# Patient Record
Sex: FEMALE | Race: OTHER | Employment: FULL TIME | URBAN - METROPOLITAN AREA
[De-identification: names, ages, dates, MRNs, and addresses within clinical notes are randomized per-mention and may not be internally consistent; named-entity substitution may affect disease eponyms.]

---

## 2018-04-10 ENCOUNTER — HOSPITAL ENCOUNTER (OUTPATIENT)
Dept: MAMMOGRAPHY | Age: 43
Discharge: HOME | End: 2018-04-10
Attending: INTERNAL MEDICINE | Admitting: INTERNAL MEDICINE

## 2018-04-10 DIAGNOSIS — Z12.31 VISIT FOR SCREENING MAMMOGRAM: ICD-10-CM

## 2020-07-03 ENCOUNTER — HOSPITAL ENCOUNTER (EMERGENCY)
Age: 45
Discharge: HOME | End: 2020-07-03
Attending: EMERGENCY MEDICINE

## 2020-07-03 ENCOUNTER — APPOINTMENT (OUTPATIENT)
Dept: GENERAL RADIOLOGY | Age: 45
End: 2020-07-03

## 2020-07-03 VITALS
HEART RATE: 78 BPM | SYSTOLIC BLOOD PRESSURE: 148 MMHG | DIASTOLIC BLOOD PRESSURE: 80 MMHG | RESPIRATION RATE: 18 BRPM | OXYGEN SATURATION: 99 % | TEMPERATURE: 98.6 F

## 2020-07-03 PROCEDURE — 99283 EMERGENCY DEPT VISIT LOW MDM: CPT

## 2020-07-03 PROCEDURE — U0003 INFECTIOUS AGENT DETECTION BY NUCLEIC ACID (DNA OR RNA); SEVERE ACUTE RESPIRATORY SYNDROME CORONAVIRUS 2 (SARS-COV-2) (CORONAVIRUS DISEASE [COVID-19]), AMPLIFIED PROBE TECHNIQUE, MAKING USE OF HIGH THROUGHPUT TECHNOLOGIES AS DESCRIBED BY CMS-2020-01-R: HCPCS

## 2020-07-03 PROCEDURE — U0002 COVID-19 LAB TEST NON-CDC: HCPCS

## 2020-07-03 PROCEDURE — 71045 X-RAY EXAM CHEST 1 VIEW: CPT

## 2020-07-03 RX ORDER — AZITHROMYCIN 250 MG/1
TABLET, FILM COATED ORAL
Qty: 1 PACKET | Refills: 0 | Status: SHIPPED | OUTPATIENT
Start: 2020-07-03 | End: 2021-12-31

## 2020-07-03 RX ORDER — BENZONATATE 100 MG/1
100 CAPSULE ORAL 3 TIMES DAILY PRN
Qty: 30 CAPSULE | Refills: 0 | Status: SHIPPED | OUTPATIENT
Start: 2020-07-03 | End: 2020-07-10

## 2020-07-03 NOTE — ED TRIAGE NOTES
Patient brought to treatment area wearing a mask. Rn wearing procedure mask, safety goggles and face shield. Patient c/o productive cough and tingling in throat for 4 weeks. States symptoms come and go. Denies fever, shortness of breath, chest pain, nausea or vomiting. States she was exposed to the corona virus through her 7 month old nephew who tested positive. States she quarantine for 2 weeks and then went back to work and then developed the productive cough. History obtained through  line # [id number].

## 2020-07-03 NOTE — ED PROVIDER NOTES
Emergency Physician Note    Chief Complaint  Cough (c/o productive cough for 4 weeks and tingling in throat.  )       History of Present Illness  Chay Cifuentes is a 39 y.o. female who presents to the ED for cough. Patient gives a history through [employer] [place] . She reports that she has had a productive cough of green sputum for the last 4 weeks off and on. No history of asthma or COPD. She was exposed to a nephew that was positive for COVID-19 but that exposure was over 2 weeks ago. She denies any fevers, sweats or chills. No chest pain or shortness of breath. No nausea, vomiting or diarrhea. No rhinorrhea or sore throat. 10 systems reviewed, pertinent positives per HPI otherwise noted to be negative    I have reviewed the following from the nursing documentation:      Prior to Admission medications    Not on File       Allergies as of 07/03/2020    (No Known Allergies)       History reviewed. No pertinent past medical history. Surgical History: History reviewed. No pertinent surgical history. Family History:  History reviewed. No pertinent family history.     Social History     Socioeconomic History    Marital status: Single     Spouse name: Not on file    Number of children: Not on file    Years of education: Not on file    Highest education level: Not on file   Occupational History    Not on file   Social Needs    Financial resource strain: Not on file    Food insecurity     Worry: Not on file     Inability: Not on file    Transportation needs     Medical: Not on file     Non-medical: Not on file   Tobacco Use    Smoking status: Never Smoker   Substance and Sexual Activity    Alcohol use: No    Drug use: Not on file    Sexual activity: Yes     Partners: Male   Lifestyle    Physical activity     Days per week: Not on file     Minutes per session: Not on file    Stress: Not on file   Relationships    Social connections     Talks on phone: Not on file     Gets together: Not on file     Attends Orthodox service: Not on file     Active member of club or organization: Not on file     Attends meetings of clubs or organizations: Not on file     Relationship status: Not on file    Intimate partner violence     Fear of current or ex partner: Not on file     Emotionally abused: Not on file     Physically abused: Not on file     Forced sexual activity: Not on file   Other Topics Concern    Not on file   Social History Narrative    Not on file       Nursing notes reviewed. ED Triage Vitals [07/03/20 1804]   Enc Vitals Group      BP (!) 148/80      Pulse 78      Resp 18      Temp 98.6 °F (37 °C)      Temp Source Oral      SpO2 99 %      Weight       Height       Head Circumference       Peak Flow       Pain Score       Pain Loc       Pain Edu? Excl. in [address]? GENERAL:  Awake, alert. Well developed, well nourished with no apparent distress. HENT:  Normocephalic, Atraumatic, moist mucous membranes. Posterior pharynx without erythema, edema or exudate. EYES:  Pupils equal round and reactive to light, Conjunctiva normal, extraocular movements normal.  NECK:  No meningeal signs, Supple. CHEST:  Regular rate and rhythm, chest wall non-tender. LUNGS: Few scattered rhonchi but otherwise clear bilaterally. ABDOMEN:  Soft, non-tender, no rebound, rigidity or guarding, non-distended, normal bowel sounds. No costovertebral angle tenderness to palpation. BACK:  No tenderness. EXTREMITIES:  Normal range of motion, no edema, no bony tenderness, no deformity, distal pulses present. SKIN: Warm, dry and intact. NEUROLOGIC: Normal mental status. Moving all extremities to command. RADIOLOGY  X-RAYS:  I have reviewed radiologic plain film image(s). ALL OTHER NON-PLAIN FILM IMAGES SUCH AS CT, ULTRASOUND AND MRI HAVE BEEN READ BY THE RADIOLOGIST. XR CHEST PORTABLE   Final Result   Unremarkable portable chest radiograph.               LABS  Labs Reviewed   COVID-19   COVID-19 COVID-19       MEDICAL DECISION MAKING     I advised the patient to return to the emergency department immediately for any new or worsening symptoms. The patient voiced agreement and understanding of the treatment plan. The patient is low risk for mortality based on demographic, history and clinical factors. Given best available information and clinical assessment, I estimate the risk of hospitalization be greater than the risk of treatment at home. I have explained to the patient that the risk could rapidly change, given precautions for return and instructions on how to minimize being contagious. No results found for this visit on 07/03/20. I estimate there is LOW risk for EPIGLOTTITIS, PNEUMONIA, MENINGITIS, OR URINARY TRACT INFECTION, thus I consider the discharge disposition reasonable. Also, there is no evidence or peritonitis, sepsis, or toxicity. [facility] and I have discussed the diagnosis and risks, and we agree with discharging home to follow-up with their primary doctor. We also discussed returning to the Emergency Department immediately if new or worsening symptoms occur. We have discussed the symptoms which are most concerning (e.g., changing or worsening pain, trouble swallowing or breating, neck stiffness, fever) that necessitate immediate return. Final Impression    1. Bronchitis    2. Upper respiratory tract infection, unspecified type        Discharge Vital Signs:  Blood pressure (!) 148/80, pulse 78, temperature 98.6 °F (37 °C), temperature source Oral, resp. rate 18, last menstrual period 06/06/2020, SpO2 99 %. Patient was given scripts for the following medications. I counseled patient how to take these medications.   New Prescriptions    AZITHROMYCIN (ZITHROMAX) 250 MG TABLET    Per package instructions    BENZONATATE (TESSALON PERLES) 100 MG CAPSULE    Take 1 capsule by mouth 3 times daily as needed for Cough       Disposition  Pt is in good condition upon Discharge

## 2020-07-04 ENCOUNTER — CARE COORDINATION (OUTPATIENT)
Dept: CARE COORDINATION | Age: 45
End: 2020-07-04

## 2020-07-04 LAB — SARS-COV-2, PCR: NOT DETECTED

## 2020-07-04 NOTE — CARE COORDINATION
Care Transition phone outreach s/p acute care visit 7. 3.20  With support of [employer], left detailed vm requesting return callback in review of pt status. Provided direct contact number and [facility] as well.

## 2020-07-05 NOTE — RESULT ENCOUNTER NOTE
Patient's negative result has been appropriately evaluated by the provider pool. Patient was unable to be reached over the phone. A voicemail was left with the patient. Will await a return phone call. Will try to reach patient again tomorrow per protocol.

## 2021-12-31 ENCOUNTER — HOSPITAL ENCOUNTER (EMERGENCY)
Age: 46
Discharge: HOME | End: 2021-12-31
Attending: EMERGENCY MEDICINE

## 2021-12-31 VITALS
SYSTOLIC BLOOD PRESSURE: 122 MMHG | RESPIRATION RATE: 14 BRPM | DIASTOLIC BLOOD PRESSURE: 75 MMHG | TEMPERATURE: 97.9 F | HEART RATE: 94 BPM | BODY MASS INDEX: 41.96 KG/M2 | WEIGHT: 228.02 LBS | OXYGEN SATURATION: 95 % | HEIGHT: 62 IN

## 2021-12-31 DIAGNOSIS — J02.9 ACUTE PHARYNGITIS, UNSPECIFIED ETIOLOGY: Primary | ICD-10-CM

## 2021-12-31 LAB
RAPID INFLUENZA  B AGN: NEGATIVE
RAPID INFLUENZA A AGN: NEGATIVE
S PYO AG THROAT QL: NEGATIVE

## 2021-12-31 PROCEDURE — 87081 CULTURE SCREEN ONLY: CPT

## 2021-12-31 PROCEDURE — 6360000002 HC RX W HCPCS: Performed by: EMERGENCY MEDICINE

## 2021-12-31 PROCEDURE — 87880 STREP A ASSAY W/OPTIC: CPT

## 2021-12-31 PROCEDURE — 87804 INFLUENZA ASSAY W/OPTIC: CPT

## 2021-12-31 PROCEDURE — 99284 EMERGENCY DEPT VISIT MOD MDM: CPT

## 2021-12-31 RX ORDER — DEXAMETHASONE 4 MG/1
6 TABLET ORAL ONCE
Status: COMPLETED | OUTPATIENT
Start: 2021-12-31 | End: 2021-12-31

## 2021-12-31 RX ADMIN — DEXAMETHASONE 6 MG: 4 TABLET ORAL at 11:17

## 2021-12-31 ASSESSMENT — PAIN DESCRIPTION - DESCRIPTORS: DESCRIPTORS: ACHING

## 2021-12-31 ASSESSMENT — PAIN - FUNCTIONAL ASSESSMENT: PAIN_FUNCTIONAL_ASSESSMENT: 0-10

## 2021-12-31 ASSESSMENT — PAIN DESCRIPTION - PAIN TYPE: TYPE: ACUTE PAIN

## 2021-12-31 ASSESSMENT — PAIN DESCRIPTION - FREQUENCY: FREQUENCY: CONTINUOUS

## 2021-12-31 ASSESSMENT — PAIN SCALES - GENERAL
PAINLEVEL_OUTOF10: 8

## 2021-12-31 ASSESSMENT — PAIN DESCRIPTION - LOCATION: LOCATION: THROAT

## 2021-12-31 NOTE — ED NOTES
Dc'd to home  Awake alert  Denied need for interpretor for discharge  Aware we will call for further culture results  To return for worsening or changes  To monitor temperature  Denied need for work note     Yoandy Salinas RN  12/31/21 1122

## 2021-12-31 NOTE — ED PROVIDER NOTES
CHIEF COMPLAINT  Pharyngitis and Nasal Congestion      HISTORY OF PRESENT ILLNESS  Cesar Tobar is a 42 y.o. female who presents emerged department for evaluation of sore throat, nasal congestion. She states that she has had the symptoms since Monday. She denies any known sick contacts to Covid or flu. She states that she has had pain in her throat. She has been able to tolerate foods and liquids. She denies any fever. Denies nausea or vomiting, abdominal pain. Denies headache or neck stiffness. History reviewed. No pertinent past medical history. History reviewed. No pertinent surgical history. History reviewed. No pertinent family history. Social History     Socioeconomic History    Marital status: Single     Spouse name: Not on file    Number of children: Not on file    Years of education: Not on file    Highest education level: Not on file   Occupational History    Not on file   Tobacco Use    Smoking status: Never Smoker    Smokeless tobacco: Never Used   Substance and Sexual Activity    Alcohol use: No    Drug use: Not on file    Sexual activity: Yes     Partners: Male   Other Topics Concern    Not on file   Social History Narrative    Not on file     Social Determinants of Health     Financial Resource Strain:     Difficulty of Paying Living Expenses: Not on file   Food Insecurity:     Worried About Running Out of Food in the Last Year: Not on file    Lluvia of Food in the Last Year: Not on file   Transportation Needs:     Lack of Transportation (Medical): Not on file    Lack of Transportation (Non-Medical):  Not on file   Physical Activity:     Days of Exercise per Week: Not on file    Minutes of Exercise per Session: Not on file   Stress:     Feeling of Stress : Not on file   Social Connections:     Frequency of Communication with Friends and Family: Not on file    Frequency of Social Gatherings with Friends and Family: Not on file    Attends [employer]ze Services: Not on file   [facility] or Organizations: Not on file    Attends Club or Organization Meetings: Not on file    Marital Status: Not on file   Intimate Partner Violence:     Fear of Current or Ex-Partner: Not on file    Emotionally Abused: Not on file    Physically Abused: Not on file    Sexually Abused: Not on file   Housing Stability:     Unable to Pay for Housing in the Last Year: Not on file    Number of [place] in the Last Year: Not on file    Unstable Housing in the Last Year: Not on file     Current Facility-Administered Medications   Medication Dose Route Frequency Provider Last Rate Last Admin    dexamethasone (DECADRON) tablet 6 mg  6 mg Oral Once Eleanor Truong MD         No current outpatient medications on file. No Known Allergies    REVIEW OF SYSTEMS  Review of Systems   Constitutional: Negative for chills and fever. HENT: Positive for congestion and sore throat. Negative for trouble swallowing and voice change. Respiratory: Negative for cough, shortness of breath and stridor. Gastrointestinal: Negative for nausea and vomiting. Musculoskeletal: Negative for myalgias, neck pain and neck stiffness. Skin: Negative for rash. Neurological: Negative for weakness, light-headedness and headaches. PHYSICAL EXAM  /75   Pulse 94   Temp 97.9 °F (36.6 °C) (Oral)   Resp 14   Ht 5' 2\" (1.575 m)   Wt 228 lb 0.3 oz (103.4 kg)   LMP 12/07/2021   SpO2 95%   BMI 41.71 kg/m²   GENERAL APPEARANCE: Awake and alert. Cooperative. HEAD: Normocephalic. Atraumatic. EYES: PERRL. EOM's grossly intact. ENT: Mucous membranes are moist.  No significant posterior oropharyngeal swelling or erythema. NECK: Supple, trachea midline. No significant lymphadenopathy  HEART: RRR. No harsh murmurs. Intact radial pulses 2+ bilaterally. LUNGS: Respirations unlabored without accessory muscle use. CTAB. Good air exchange. No wheezes, rales, or rhonchi.   Speaking comfortably in full sentences. ABDOMEN: Soft. Non-distended. Non-tender. No guarding or rebound. EXTREMITIES: No peripheral edema. No acute deformities. SKIN: Warm and dry. No acute rashes. NEUROLOGICAL: Alert and oriented X 3. No focal deficits    LABS  I have reviewed all labs for this visit. Results for orders placed or performed during the hospital encounter of 12/31/21   Rapid influenza A/B antigens    Specimen: Nasopharyngeal   Result Value Ref Range    Rapid Influenza A Ag Negative Negative    Rapid Influenza B Ag Negative Negative   Strep Screen Group A Throat    Specimen: Throat   Result Value Ref Range    Rapid Strep A Screen Negative Negative       RADIOLOGY  X-RAYS:  I have reviewed radiologic plain film image(s). ALL OTHER NON-PLAIN FILM IMAGES SUCH AS CT, ULTRASOUND AND MRI HAVE BEEN READ BY THE RADIOLOGIST. No orders to display              ED COURSE/MDM  Patient seen and evaluated. Old records reviewed. Labs and imaging reviewed and results discussed with patient. 46-year-old female presenting with URI symptoms, sore throat. There is no significant posterior oropharyngeal erythema, swelling. She has no respiratory distress, no focal breath sounds. Evaluation include rapid influenza testing, Covid testing, strep testing. Rapid influenza and strep was negative. Patient was advised of need for follow-up with COVID-19 testing. Patient will be given Decadron for sore throat. She was advised on continued symptomatic management Tylenol, ibuprofen and follow-up with PCP. The patient will be discharged from the emergency department. The patient was counseled on their diagnosis and any medications prescribed. They were advised on the need for PCP followup. They were counseled on the need to return to the emergency department if any of their symptoms were to worsen, change or have any other concerns. Discharged in stable condition. CLINICAL IMPRESSION  1.  Acute pharyngitis, unspecified etiology Blood pressure 122/75, pulse 94, temperature 97.9 °F (36.6 °C), temperature source Oral, resp. rate 14, height 5' 2\" (1.575 m), weight 228 lb 0.3 oz (103.4 kg), last menstrual period 12/07/2021, SpO2 95 %. [facility] [address] [facility] [address] [facility] to home in stable condition. This chart was generated in part by using Dragon Dictation system and may contain errors related to that system including errors in grammar, punctuation, and spelling, as well as words and phrases that may be inappropriate. If there are any questions or concerns please feel free to contact the dictating provider for clarification.      Renae Lizarraga MD  12/31/21 5729

## 2022-01-02 LAB — S PYO THROAT QL CULT: NORMAL
